# Patient Record
Sex: MALE | Race: WHITE | Employment: OTHER | URBAN - METROPOLITAN AREA
[De-identification: names, ages, dates, MRNs, and addresses within clinical notes are randomized per-mention and may not be internally consistent; named-entity substitution may affect disease eponyms.]

---

## 2020-08-07 ENCOUNTER — APPOINTMENT (OUTPATIENT)
Dept: GENERAL RADIOLOGY | Age: 80
End: 2020-08-07
Payer: MEDICARE

## 2020-08-07 ENCOUNTER — APPOINTMENT (OUTPATIENT)
Dept: CT IMAGING | Age: 80
End: 2020-08-07
Payer: MEDICARE

## 2020-08-07 ENCOUNTER — HOSPITAL ENCOUNTER (EMERGENCY)
Age: 80
Discharge: HOME | End: 2020-08-07
Attending: EMERGENCY MEDICINE
Payer: MEDICARE

## 2020-08-07 VITALS
SYSTOLIC BLOOD PRESSURE: 177 MMHG | HEART RATE: 97 BPM | RESPIRATION RATE: 18 BRPM | OXYGEN SATURATION: 95 % | TEMPERATURE: 97.9 F | DIASTOLIC BLOOD PRESSURE: 109 MMHG

## 2020-08-07 LAB
ALBUMIN SERPL-MCNC: 3.8 G/DL (ref 3.5–5.2)
ALP BLD-CCNC: 126 U/L (ref 40–129)
ALT SERPL-CCNC: 45 U/L (ref 0–40)
ANION GAP SERPL CALCULATED.3IONS-SCNC: 12 MMOL/L (ref 7–16)
AST SERPL-CCNC: 51 U/L (ref 0–39)
BASOPHILS ABSOLUTE: 0.03 E9/L (ref 0–0.2)
BASOPHILS RELATIVE PERCENT: 0.5 % (ref 0–2)
BILIRUB SERPL-MCNC: 0.8 MG/DL (ref 0–1.2)
BUN BLDV-MCNC: 10 MG/DL (ref 8–23)
CALCIUM SERPL-MCNC: 9.3 MG/DL (ref 8.6–10.2)
CHLORIDE BLD-SCNC: 102 MMOL/L (ref 98–107)
CO2: 24 MMOL/L (ref 22–29)
CREAT SERPL-MCNC: 0.8 MG/DL (ref 0.7–1.2)
EKG ATRIAL RATE: 95 BPM
EKG P AXIS: 25 DEGREES
EKG P-R INTERVAL: 214 MS
EKG Q-T INTERVAL: 374 MS
EKG QRS DURATION: 94 MS
EKG QTC CALCULATION (BAZETT): 469 MS
EKG R AXIS: 1 DEGREES
EKG VENTRICULAR RATE: 95 BPM
EOSINOPHILS ABSOLUTE: 0.09 E9/L (ref 0.05–0.5)
EOSINOPHILS RELATIVE PERCENT: 1.4 % (ref 0–6)
GFR AFRICAN AMERICAN: >60
GFR NON-AFRICAN AMERICAN: >60 ML/MIN/1.73
GLUCOSE BLD-MCNC: 115 MG/DL (ref 74–99)
HCT VFR BLD CALC: 47.9 % (ref 37–54)
HEMOGLOBIN: 15.9 G/DL (ref 12.5–16.5)
IMMATURE GRANULOCYTES #: 0.03 E9/L
IMMATURE GRANULOCYTES %: 0.5 % (ref 0–5)
LYMPHOCYTES ABSOLUTE: 1.25 E9/L (ref 1.5–4)
LYMPHOCYTES RELATIVE PERCENT: 19.2 % (ref 20–42)
MCH RBC QN AUTO: 30.1 PG (ref 26–35)
MCHC RBC AUTO-ENTMCNC: 33.2 % (ref 32–34.5)
MCV RBC AUTO: 90.5 FL (ref 80–99.9)
MONOCYTES ABSOLUTE: 0.77 E9/L (ref 0.1–0.95)
MONOCYTES RELATIVE PERCENT: 11.8 % (ref 2–12)
NEUTROPHILS ABSOLUTE: 4.35 E9/L (ref 1.8–7.3)
NEUTROPHILS RELATIVE PERCENT: 66.6 % (ref 43–80)
PDW BLD-RTO: 13.1 FL (ref 11.5–15)
PLATELET # BLD: 231 E9/L (ref 130–450)
PMV BLD AUTO: 10.9 FL (ref 7–12)
POTASSIUM REFLEX MAGNESIUM: 3.9 MMOL/L (ref 3.5–5)
RBC # BLD: 5.29 E12/L (ref 3.8–5.8)
SODIUM BLD-SCNC: 138 MMOL/L (ref 132–146)
TOTAL PROTEIN: 6.8 G/DL (ref 6.4–8.3)
TROPONIN: <0.01 NG/ML (ref 0–0.03)
WBC # BLD: 6.5 E9/L (ref 4.5–11.5)

## 2020-08-07 PROCEDURE — 99283 EMERGENCY DEPT VISIT LOW MDM: CPT

## 2020-08-07 PROCEDURE — 12011 RPR F/E/E/N/L/M 2.5 CM/<: CPT

## 2020-08-07 PROCEDURE — 70450 CT HEAD/BRAIN W/O DYE: CPT

## 2020-08-07 PROCEDURE — 73130 X-RAY EXAM OF HAND: CPT

## 2020-08-07 PROCEDURE — 70486 CT MAXILLOFACIAL W/O DYE: CPT

## 2020-08-07 PROCEDURE — 93005 ELECTROCARDIOGRAM TRACING: CPT | Performed by: EMERGENCY MEDICINE

## 2020-08-07 PROCEDURE — 6360000002 HC RX W HCPCS: Performed by: EMERGENCY MEDICINE

## 2020-08-07 PROCEDURE — 90715 TDAP VACCINE 7 YRS/> IM: CPT | Performed by: EMERGENCY MEDICINE

## 2020-08-07 PROCEDURE — 84484 ASSAY OF TROPONIN QUANT: CPT

## 2020-08-07 PROCEDURE — 99284 EMERGENCY DEPT VISIT MOD MDM: CPT

## 2020-08-07 PROCEDURE — 80053 COMPREHEN METABOLIC PANEL: CPT

## 2020-08-07 PROCEDURE — 90471 IMMUNIZATION ADMIN: CPT | Performed by: EMERGENCY MEDICINE

## 2020-08-07 PROCEDURE — 71045 X-RAY EXAM CHEST 1 VIEW: CPT

## 2020-08-07 PROCEDURE — 73110 X-RAY EXAM OF WRIST: CPT

## 2020-08-07 PROCEDURE — 2580000003 HC RX 258: Performed by: EMERGENCY MEDICINE

## 2020-08-07 PROCEDURE — 2500000003 HC RX 250 WO HCPCS: Performed by: EMERGENCY MEDICINE

## 2020-08-07 PROCEDURE — 85025 COMPLETE CBC W/AUTO DIFF WBC: CPT

## 2020-08-07 PROCEDURE — 72125 CT NECK SPINE W/O DYE: CPT

## 2020-08-07 RX ORDER — 0.9 % SODIUM CHLORIDE 0.9 %
1000 INTRAVENOUS SOLUTION INTRAVENOUS ONCE
Status: COMPLETED | OUTPATIENT
Start: 2020-08-07 | End: 2020-08-07

## 2020-08-07 RX ORDER — LIDOCAINE HYDROCHLORIDE 10 MG/ML
10 INJECTION, SOLUTION INFILTRATION; PERINEURAL ONCE
Status: COMPLETED | OUTPATIENT
Start: 2020-08-07 | End: 2020-08-07

## 2020-08-07 RX ADMIN — TETANUS TOXOID, REDUCED DIPHTHERIA TOXOID AND ACELLULAR PERTUSSIS VACCINE, ADSORBED 0.5 ML: 5; 2.5; 8; 8; 2.5 SUSPENSION INTRAMUSCULAR at 17:18

## 2020-08-07 RX ADMIN — LIDOCAINE HYDROCHLORIDE 10 ML: 10 INJECTION, SOLUTION INFILTRATION; PERINEURAL at 17:30

## 2020-08-07 RX ADMIN — SODIUM CHLORIDE 1000 ML: 9 INJECTION, SOLUTION INTRAVENOUS at 13:06

## 2020-08-07 NOTE — ED PROVIDER NOTES
Department of Emergency Medicine   ED  Provider Note  Admit Date/RoomTime: 8/7/2020 12:43 PM  ED Room: 24/24                HPI:  8/7/20, Time: 1:01 PM EDT  . Valentin Reddy is a 68 y.o. male presenting to the ED after a fall, beginning prior to arrival.  The complaint has been constant, moderate in severity, and worsened by nothing. Non-syncopal fall while walking down 2 stairs. He hit his head, no loss of consciousness. He complains of laceration along his nose. He also complains of paraspinal neck pain. He actually denies back pain despite what the triage note says. He also reports that he injured his left hand with his fall. He denies any lacerations. He does reports he jammed it. This was not syncope. No fevers or chills. He denies chest pain or shortness of breath. He denies abdominal pain. This was not syncope. He denies any other injuries or other complaints. Glascow Coma Scale at time of initial examination  Best Eye Response 4 - Opens eyes on own   Best Verbal Response 5 - Alert and oriented   Best Motor Response 6 - Follows simple motor commands   Total 15       Review of Systems:   Pertinent positives and negatives are stated within HPI, all other systems reviewed and are negative.              --------------------------------------------- PAST HISTORY ---------------------------------------------  Past Medical History:  has a past medical history of Arthritis, CAD (coronary artery disease), Coronary stent patent, Hyperlipidemia, and Hypertension. Past Surgical History:  has a past surgical history that includes knee surgery (Bilateral) and hip surgery (Left). Social History:  reports that he has never smoked. He has never used smokeless tobacco.    Family History: family history is not on file. The patients home medications have been reviewed. Allergies: Patient has no known allergies.             ------------------------- NURSING NOTES AND VITALS REVIEWED ---------------------------   The nursing notes within the ED encounter and vital signs as below have been reviewed. BP (!) 177/109   Pulse 97   Temp 97.9 °F (36.6 °C)   Resp 18   SpO2 95%   Oxygen Saturation Interpretation: Normal    The patients available past medical records and past encounters were reviewed.           -------------------------------------------------- RESULTS -------------------------------------------------    LABS:  Results for orders placed or performed during the hospital encounter of 08/07/20   CBC Auto Differential   Result Value Ref Range    WBC 6.5 4.5 - 11.5 E9/L    RBC 5.29 3.80 - 5.80 E12/L    Hemoglobin 15.9 12.5 - 16.5 g/dL    Hematocrit 47.9 37.0 - 54.0 %    MCV 90.5 80.0 - 99.9 fL    MCH 30.1 26.0 - 35.0 pg    MCHC 33.2 32.0 - 34.5 %    RDW 13.1 11.5 - 15.0 fL    Platelets 965 008 - 684 E9/L    MPV 10.9 7.0 - 12.0 fL    Neutrophils % 66.6 43.0 - 80.0 %    Immature Granulocytes % 0.5 0.0 - 5.0 %    Lymphocytes % 19.2 (L) 20.0 - 42.0 %    Monocytes % 11.8 2.0 - 12.0 %    Eosinophils % 1.4 0.0 - 6.0 %    Basophils % 0.5 0.0 - 2.0 %    Neutrophils Absolute 4.35 1.80 - 7.30 E9/L    Immature Granulocytes # 0.03 E9/L    Lymphocytes Absolute 1.25 (L) 1.50 - 4.00 E9/L    Monocytes Absolute 0.77 0.10 - 0.95 E9/L    Eosinophils Absolute 0.09 0.05 - 0.50 E9/L    Basophils Absolute 0.03 0.00 - 0.20 E9/L   Comprehensive Metabolic Panel w/ Reflex to MG   Result Value Ref Range    Sodium 138 132 - 146 mmol/L    Potassium reflex Magnesium 3.9 3.5 - 5.0 mmol/L    Chloride 102 98 - 107 mmol/L    CO2 24 22 - 29 mmol/L    Anion Gap 12 7 - 16 mmol/L    Glucose 115 (H) 74 - 99 mg/dL    BUN 10 8 - 23 mg/dL    CREATININE 0.8 0.7 - 1.2 mg/dL    GFR Non-African American >60 >=60 mL/min/1.73    GFR African American >60     Calcium 9.3 8.6 - 10.2 mg/dL    Total Protein 6.8 6.4 - 8.3 g/dL    Alb 3.8 3.5 - 5.2 g/dL    Total Bilirubin 0.8 0.0 - 1.2 mg/dL    Alkaline Phosphatase 126 40 - 129 U/L    ALT 45 (H) 0 - 40 U/L    AST 51 (H) 0 - 39 U/L   Troponin   Result Value Ref Range    Troponin <0.01 0.00 - 0.03 ng/mL   EKG 12 Lead   Result Value Ref Range    Ventricular Rate 95 BPM    Atrial Rate 95 BPM    P-R Interval 214 ms    QRS Duration 94 ms    Q-T Interval 374 ms    QTc Calculation (Bazett) 469 ms    P Axis 25 degrees    R Axis 1 degrees       RADIOLOGY:  Interpreted by Radiologist.  CT HEAD WO CONTRAST   Final Result   No skull fracture or acute intracranial abnormality. CT CERVICAL SPINE WO CONTRAST   Final Result   No evidence of fracture or dislocation of cervical spine. There is   diffuse degenerative changes from C3 to T1 with osteophytes and   multilevel disc bulges. CT FACIAL BONES WO CONTRAST   Final Result   Right anterior nasal fracture without nasal septal trauma, and soft   tissue swelling in the left orbital preseptal soft tissues extending   over the forehead bilaterally. No evidence of paranasal sinus trauma or other acute traumatic   findings. Dental disease is noted in the maxillary region, with lytic changes   about the dental roots. XR CHEST PORTABLE   Final Result      No acute cardiopulmonary abnormality. Enlarged cardiac silhouette, which may be in part or entirely due to   the magnification artifact from the AP portable technique. XR HAND LEFT (MIN 3 VIEWS)   Final Result      1. No fracture or joint dislocation. 2. Degenerative changes, as described. 3. Tiny radiopaque foreign body along the palmar soft tissues of the   second metacarpal head. XR WRIST LEFT (MIN 3 VIEWS)   Final Result   No definite acute fracture or dislocation. Severe osteoarthritis as   noted. Osteopenia.           EKG Interpretation  Interpreted by emergency department physician, Dr. Brooke      8/7/20  Time: [id number]    Rhythm: normal sinus  and 1 degree AV block  Rate: normal  Axis: left  Conduction: normal  ST Segments: no acute change  T Waves: no acute change    Clinical Impression: Sinus rhythm, no acute ischemic changes  Comparison to Old EKG  No old EKG        ---------------------------------------------------PHYSICAL EXAM--------------------------------------      Primary Survey:  Airway: patient, trachea midline,   Breathing: Spontaneous, breath sounds equal bilaterally, symmetric chest rise  Circulation: 2+ femoral pulses, 2+ DP/PT pulses  Disability: GCS 15      Constitutional/General: Alert and oriented x3, well appearing, non toxic in NAD  Head: Normocephalic and 2 cm stellate laceration along the nasal bridge. There is no gross contamination or foreign bodies. Eyes: PERRL, EOMI, no entrapment, no hyphema  Mouth: Oropharynx clear, handling secretions, no trismus. No dental trauma, no oral trauma  Neck: Immobilized in cervical collar. No crepitus, no lacerations, abrasions, deformities, or stepoffs. Back: No midline cervical, thoracic, lumbar spine tenderness. No Stepoffs, abrasions, lacerations, or deformities. Paraspinal tenderness on the cervical spine. Nose: No septal hematoma, no deviation, no epistaxis  Pulmonary: Lungs clear to auscultation bilaterally, no wheezes, rales, or rhonchi. Not in respiratory distress  Chest: no chest wall tenderness, no crepitus  Cardiovascular:  Regular rate and rhythm, no murmurs, gallops, or rubs. 2+ distal pulses  Abdomen: Soft, non tender, non distended, +BS, no rebound, guarding, or rigidity. No pulsatile masses appreciated  Extremities: Moves all extremities x 4. Warm and well perfused, no clubbing, cyanosis, or edema. Capillary refill <3 seconds. No scaphoid tenderness on either wrist.  His left wrist has some tenderness along the dorsal hand but there is no lacerations. There are a few superficial abrasions on both upper extremities but no lacerations. Both hands were inspected and there are no lacerations and no signs of foreign body.   He had some dirt and debris on his hands but there were no foreign bodies and nothing embedded within the skin. The questionable foreign body on x-ray there was nothing on the skin and nothing visible with no lacerations in this region at all. Both extremities are Neurovascularly intact distally. 2+ radial pulses. Capillary refill <3 secondary. Warm and well perfused. Median, radial, ulnar nerves intact. Sensation intact to light touch. 5/5 strength. Cardinal movements of the hand intact. No evidence of compartment syndrome. All flexor and extensor mechanisms intact. Skin: warm and dry without rash  Neurologic: GCS 15, CN 2-12 grossly intact, no focal deficits, symmetric strength 5/5 in the upper and lower extremities bilaterally. Normal hand grasp bilaterally. No motor weakness. Psych: Normal Affect        ------------------------------ ED COURSE/MEDICAL DECISION MAKING----------------------  Medications   0.9 % sodium chloride bolus (0 mLs Intravenous Stopped 8/7/20 1722)   Tetanus-Diphth-Acell Pertussis (BOOSTRIX) injection 0.5 mL (0.5 mLs Intramuscular Given 8/7/20 1718)   lidocaine 1 % injection 10 mL (10 mLs Intradermal Given 8/7/20 1730)       LACERATION REPAIR  PROCEDURE NOTE:  Unless otherwise indicated, this procedure was done or directly supervised by the ED attending. Laceration #: 1. Location: nose  Length: 2cm. The wound area was irrigated with sterile saline, cleansend with shur-clens and draped in a sterile fashion. The wound area was anesthetized with Lidocaine 1% without epinephrine. WOUND COMPLEXITY:    Debridement: None. Undermining: None. Wound Margins Revised: None. Flaps Aligned: yes. The wound was explored with the following results no foreign body or tendon injury seen. The wound was closed with 6-0 Prolene using interrupted sutures. Dressing:  a sterile dressing was placed. Total number suture: 6      Medical Decision Making:    Fall with nasal laceration and nasal fracture.   I spoke with Dr. Sveta Toure for facial trauma, he recommended closing the laceration and he will follow-up with the patient as an outpatient. Laceration was copiously irrigated and closed with interrupted suture. There were no foreign bodies on evaluation of his hands, x-ray was reviewed and specific attention was called to this area in his left hand and there was no obvious foreign body and no lacerations in the skin and nothing palpable under the skin. Re-Evaluations:             Re-evaluation. Patients symptoms are improving     Consults:  Dr. Nathan Banuelos      This patient's ED course included: a personal history and physicial examination, re-evaluation prior to disposition, complex medical decision making and emergency management and a personal history and physicial eaxmination    This patient has remained hemodynamically stable during their ED course. Counseling: The emergency provider has spoken with the patient and spouse/SO and discussed todays results, in addition to providing specific details for the plan of care and counseling regarding the diagnosis and prognosis. Questions are answered at this time and they are agreeable with the plan.       --------------------------------- IMPRESSION AND DISPOSITION ---------------------------------    IMPRESSION  1. Closed fracture of nasal bone, initial encounter    2. Facial laceration, initial encounter    3.  Closed head injury, initial encounter        DISPOSITION  Disposition: Discharge to home  Patient condition is good           Duke Calvo MD  08/07/20 2034